# Patient Record
Sex: FEMALE
[De-identification: names, ages, dates, MRNs, and addresses within clinical notes are randomized per-mention and may not be internally consistent; named-entity substitution may affect disease eponyms.]

---

## 2024-08-28 ENCOUNTER — APPOINTMENT (OUTPATIENT)
Dept: UROGYNECOLOGY | Facility: CLINIC | Age: 64
End: 2024-08-28

## 2024-08-28 ENCOUNTER — TRANSCRIPTION ENCOUNTER (OUTPATIENT)
Age: 64
End: 2024-08-28

## 2024-08-28 VITALS
DIASTOLIC BLOOD PRESSURE: 60 MMHG | HEIGHT: 64 IN | WEIGHT: 144 LBS | BODY MASS INDEX: 24.59 KG/M2 | SYSTOLIC BLOOD PRESSURE: 130 MMHG

## 2024-08-28 DIAGNOSIS — R35.0 FREQUENCY OF MICTURITION: ICD-10-CM

## 2024-08-28 DIAGNOSIS — R30.0 DYSURIA: ICD-10-CM

## 2024-08-28 DIAGNOSIS — N95.2 POSTMENOPAUSAL ATROPHIC VAGINITIS: ICD-10-CM

## 2024-08-28 PROBLEM — Z00.00 ENCOUNTER FOR PREVENTIVE HEALTH EXAMINATION: Status: ACTIVE | Noted: 2024-08-28

## 2024-08-28 LAB
BILIRUB UR QL STRIP: NEGATIVE
CLARITY UR: CLEAR
COLLECTION METHOD: NORMAL
GLUCOSE UR-MCNC: NEGATIVE
HCG UR QL: 0.2 EU/DL
HGB UR QL STRIP.AUTO: NEGATIVE
KETONES UR-MCNC: NEGATIVE
LEUKOCYTE ESTERASE UR QL STRIP: NEGATIVE
NITRITE UR QL STRIP: NEGATIVE
PH UR STRIP: 6.5
PROT UR STRIP-MCNC: NEGATIVE
SP GR UR STRIP: 1.01

## 2024-08-28 PROCEDURE — 81003 URINALYSIS AUTO W/O SCOPE: CPT | Mod: QW

## 2024-08-28 PROCEDURE — 99459 PELVIC EXAMINATION: CPT

## 2024-08-28 PROCEDURE — 99204 OFFICE O/P NEW MOD 45 MIN: CPT | Mod: 25

## 2024-08-28 PROCEDURE — 51701 INSERT BLADDER CATHETER: CPT | Mod: 59

## 2024-08-28 RX ORDER — PHENAZOPYRIDINE HYDROCHLORIDE 200 MG/1
200 TABLET ORAL 3 TIMES DAILY
Qty: 15 | Refills: 3 | Status: ACTIVE | COMMUNITY
Start: 2024-08-28 | End: 1900-01-01

## 2024-08-28 RX ORDER — ESTRADIOL 0.1 MG/G
0.1 CREAM VAGINAL
Qty: 1 | Refills: 3 | Status: ACTIVE | COMMUNITY
Start: 2024-08-28 | End: 1900-01-01

## 2024-08-28 NOTE — PHYSICAL EXAM
[Chaperone Present] : A chaperone was present in the examining room during all aspects of the physical examination [FreeTextEntry2] : Agnes [FreeTextEntry1] : General: Not in acute distress, alert and oriented x3. Neck: Supple. No lymphadenopathy.  Abdomen: Soft, nontender, and nondistended. No obvious hepatosplenomegaly. No obvious hernias. A well-healed low transverse skin incision. Pelvic Exam: Normal external female genitalia. Saddle sensory exam S2 to S4 is intact. Perineal reflexes not visualized. Urethra is hypermobile without prolapse, exudates, or lesions. Cough stress test is negative . Post void residual was checked with I/O cath and was 90 cc of clear urine. Pale and appearing vaginal epithelium. No vaginal blood or discharge. Cervix without abnormal lesions. Bimanual exam reveals a small uterus in normal positioning. No palpable adnexal masses or tenderness.  Small distal rectocele to -1.  Rest of the vaginal compartments are well supported.  On digital rectal exam, she has small perineal pocket with no stool remnant.  No enterocele or intussusception palpated.  Normal resting and active anal sphincter tone

## 2024-08-28 NOTE — HISTORY OF PRESENT ILLNESS
[FreeTextEntry1] : Patient is a 64-year-old para 3 ( x 1-complicated by intraoperative cystotomy,  x2) who was referred by Dr. Catalina Shay for evaluation and management of urinary frequency and pelvic pressure  Patient reports frequent urination and sensation of pelvic pressure since 2024.  She self treated her with a course of Macrobid which she had had home because she was leaving for a trip.  She is unsure if the medication was  because she did not feel much improvement.  States she was then treated by Dr. Thayer with Cipro for 3 days.  Her urine cultures during that time.  Were negative.  She reports persistent symptoms of pelvic pressure like she needs to void when she goes to the bathroom she is able to void a lot decent amount.  She started taking amlodipine last year in the morning.  Denies dysuria or flank pain.  She endorses intake of pH water.  Denies urgency.  Denies urine leakage.  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  Sometimes she does not void for 4 to 6 hours.  Other times she will be voiding every 2 hours or so I reviewed patient's medical records in [facility].  Her urine culture from 2024 was negative.  Urine culture from 2024 was also negative. She denies previous history of recurrent urinary tract infections. Denies history of gross hematuria or nephrolithiasis Vaginal symptoms: She denies sensation of vaginal bulge or pressure.  Denies vaginal dryness or itchiness.  Denies pain with intimacy  Bowel symptoms: She denies constipation.  Denies diarrhea.  Reports 3-4 small bowel movements per day depending upon her food intake .denies fecal incontinence   GYN history: LMP in .  Denies history of postmenopausal bleeding.  She used to see Dr. Torres however she has not had a GYN exam for more than 5 years.  She is looking for a new gynecologist due to change in her insurance.  Last Pap smear was around .  She denies history of abnormal Pap smear.  Most recent mammogram was 2023  Past medical history: Hypertension, hyperlipidemia, osteoarthritis, chronic left-sided rib pain, history of rib fracture  Current medications: Amlodipine, vitamin D, over-the-counter vitamin supplements, aspirin  Past surgical history: Emergent  for nonreassuring fetal heart tones after prolonged labor -intraoperative cystotomy with primary repair followed by bladder drainage for 2 weeks.  She states that she had serial imaging after the catheter was removed and no evidence of leakage in the vagina etc. was noted.

## 2024-08-28 NOTE — ASSESSMENT
[FreeTextEntry1] : Patient is a 64-year-old multipara with history of  section complicated by intraoperative cystotomy with no known residual consequences, who is presenting with new onset of urinary frequency and pelvic pressure for 1 month.  She had 2 courses of antibiotics without improvement in her symptoms.  Urine cultures have been negative.  On exam, she has a small distal rectocele stage II with presence of perineal pocket however she denies vaginal bulge symptoms or defecatory dysfunction.  Her postvoid residual was within normal limit.  Of note her voided urine specimen showed presence of leukocyte Estrace and trace blood however the Urine specimen was completely negative

## 2024-08-28 NOTE — DISCUSSION/SUMMARY
[FreeTextEntry1] : Patient was counseled regarding potential etiologies of her symptoms including urinary tract infection, passage of urinary tract stone, atrophic vaginitis, consumption of bladder irritants, overactive bladder symptoms, pelvic organ prolapse etc. after detailed discussion with the patient following management plan was outlined: 1.  Cath urine specimen was sent for urine analysis and culture.  If there is evidence of urinary tract infection, will recommend antibiotic such as Keflex 500 mg p.o. twice daily for 7 days.  If there is evidence of microscopic hematuria in the absence of positive urine culture, will recommend cystoscopy and CT urogram. 2.  Provided her a prescription for UA and culture to get tested for urine infection.  She notices any worsening of her symptoms 3.counseled her regarding avoidance of bladder irritants  4.  Discussed the finding of stage II rectocele.  Discussed symptoms, treatment options such as expectant management, avoidance of constipation and straining, pessary and surgical management in the form of posterior colpoperineorrhaphy.  At this time she denies any bothersome symptoms.  Will continue expectant management 5.  Recommended daily intake of fiber supplement such as Citrucel or with dinner Metamucil 6.  Discussed findings of exam and urine dipstick.  Offered her a trial of vaginal estradiol cream.  She will think about it.  Prescription for vaginal estradiol cream 0.1 mg/g was sent to her pharmacy.  She was advised to use 1 g of the cream per vagina every night for 2 weeks followed by 1 g of the cream per vagina 2-3 times per week 7.  Prescription for Pyridium 200 mg p.o. 3 times daily as needed bladder discomfort/dysuria//pelvic pressure was sent to her pharmacy 8.  Follow-up in 3 months

## 2024-08-28 NOTE — PROCEDURE
[Straight Catheterization] : insertion of a straight catheter [Patient] : the patient [___ Fr Straight Tip] : a [unfilled] in [place] straight tip catheter [None] : there were no complications with the catheter insertion [Clear] : clear [Culture] : culture [Urinalysis] : urinalysis [No Complications] : no complications [Tolerated Well] : the patient tolerated the procedure well [Post procedure instructions and information given] : Post procedure instructions and information were given and reviewed with patient. [0] : 0 [Urinary Frequency] : urinary frequency [Other ___] : [unfilled]

## 2024-08-29 LAB
APPEARANCE: CLEAR
BACTERIA: NEGATIVE /HPF
BILIRUBIN URINE: NEGATIVE
BLOOD URINE: NEGATIVE
CAST: 0 /LPF
COLOR: YELLOW
EPITHELIAL CELLS: 0 /HPF
GLUCOSE QUALITATIVE U: NEGATIVE MG/DL
KETONES URINE: NEGATIVE MG/DL
LEUKOCYTE ESTERASE URINE: NEGATIVE
MICROSCOPIC-UA: NORMAL
NITRITE URINE: NEGATIVE
PH URINE: 6.5
PROTEIN URINE: NEGATIVE MG/DL
RED BLOOD CELLS URINE: 0 /HPF
SPECIFIC GRAVITY URINE: 1.01
UROBILINOGEN URINE: 0.2 MG/DL
WHITE BLOOD CELLS URINE: 0 /HPF

## 2024-08-30 LAB — BACTERIA UR CULT: NORMAL

## 2024-09-16 ENCOUNTER — APPOINTMENT (OUTPATIENT)
Dept: OBGYN | Facility: CLINIC | Age: 64
End: 2024-09-16
Payer: COMMERCIAL

## 2024-09-16 VITALS
WEIGHT: 146 LBS | DIASTOLIC BLOOD PRESSURE: 84 MMHG | HEIGHT: 64 IN | BODY MASS INDEX: 24.92 KG/M2 | SYSTOLIC BLOOD PRESSURE: 132 MMHG

## 2024-09-16 DIAGNOSIS — R35.0 FREQUENCY OF MICTURITION: ICD-10-CM

## 2024-09-16 DIAGNOSIS — Z84.1 FAMILY HISTORY OF DISORDERS OF KIDNEY AND URETER: ICD-10-CM

## 2024-09-16 DIAGNOSIS — N60.19 DIFFUSE CYSTIC MASTOPATHY OF UNSPECIFIED BREAST: ICD-10-CM

## 2024-09-16 DIAGNOSIS — Z00.00 ENCOUNTER FOR GENERAL ADULT MEDICAL EXAMINATION W/OUT ABNORMAL FINDINGS: ICD-10-CM

## 2024-09-16 DIAGNOSIS — Z86.79 PERSONAL HISTORY OF OTHER DISEASES OF THE CIRCULATORY SYSTEM: ICD-10-CM

## 2024-09-16 PROCEDURE — 99459 PELVIC EXAMINATION: CPT

## 2024-09-16 PROCEDURE — 99386 PREV VISIT NEW AGE 40-64: CPT

## 2024-09-16 NOTE — PHYSICAL EXAM
[Chaperone Present] : A chaperone was present in the examining room during all aspects of the physical examination [[id number]] : A chaperone was present during the pelvic exam. [Appropriately responsive] : appropriately responsive [Alert] : alert [No Acute Distress] : no acute distress [No Lymphadenopathy] : no lymphadenopathy [Regular Rate Rhythm] : regular rate rhythm [No Murmurs] : no murmurs [Clear to Auscultation B/L] : clear to auscultation bilaterally [Soft] : soft [Non-tender] : non-tender [Non-distended] : non-distended [No HSM] : No HSM [No Lesions] : no lesions [No Mass] : no mass [Oriented x3] : oriented x3 [Examination Of The Breasts] : a normal appearance [No Masses] : no breast masses were palpable [Vulvar Atrophy] : vulvar atrophy [Labia Majora] : normal [Labia Minora] : normal [Atrophy] : atrophy [Dry Mucosa] : dry mucosa [No Bleeding] : There was no active vaginal bleeding [Normal] : normal [Uterine Adnexae] : normal [FreeTextEntry2] : Jason

## 2024-09-16 NOTE — HISTORY OF PRESENT ILLNESS
[FreeTextEntry1] : pt presents for annual exam. pt also states she has c/o urinary frequency for 3 months. pt states she had multiple negative UA and Cx, saw URO/GYN for these issues and is to f/u regarding issues. pt denies vaginal irritation, dryness, dyspareunia or burning while urinating. pt states she was prescribed sensitivity and took medication for 7 days but stopped taking it due to nipple sensitivity, pt states medication did not help alleviate her  symptoms.

## 2024-09-16 NOTE — PLAN
[FreeTextEntry1] : pap, cultures and affirm sent to lab. Pt declined UA at this time stating she has had multiple done with other providers.  Pt encouraged to continuing f/u with GYN/URO. Pt states she has mammo/sono script from pcp and will get imaging when she is due in november.

## 2024-09-16 NOTE — PHYSICAL EXAM
[Chaperone Present] : A chaperone was present in the examining room during all aspects of the physical examination [[id number]] : A chaperone was present during the pelvic exam. [Appropriately responsive] : appropriately responsive [Alert] : alert [No Acute Distress] : no acute distress [No Lymphadenopathy] : no lymphadenopathy [Regular Rate Rhythm] : regular rate rhythm [No Murmurs] : no murmurs [Clear to Auscultation B/L] : clear to auscultation bilaterally [Soft] : soft [Non-tender] : non-tender [Non-distended] : non-distended [No HSM] : No HSM [No Lesions] : no lesions [No Mass] : no mass [Oriented x3] : oriented x3 [Examination Of The Breasts] : a normal appearance [No Masses] : no breast masses were palpable [Vulvar Atrophy] : vulvar atrophy [Labia Majora] : normal [Labia Minora] : normal [Atrophy] : atrophy [Dry Mucosa] : dry mucosa [No Bleeding] : There was no active vaginal bleeding [Normal] : normal [Uterine Adnexae] : normal [FreeTextEntry2] : Jason

## 2024-09-17 LAB
C TRACH RRNA SPEC QL NAA+PROBE: NOT DETECTED
CANDIDA VAG CYTO: NOT DETECTED
G VAGINALIS+PREV SP MTYP VAG QL MICRO: NOT DETECTED
HPV HIGH+LOW RISK DNA PNL CVX: NOT DETECTED
HPV HIGH+LOW RISK DNA PNL CVX: NOT DETECTED
N GONORRHOEA RRNA SPEC QL NAA+PROBE: NOT DETECTED
SOURCE TP AMPLIFICATION: NORMAL
T VAGINALIS VAG QL WET PREP: NOT DETECTED

## 2024-09-23 LAB — CYTOLOGY CVX/VAG DOC THIN PREP: NORMAL
